# Patient Record
Sex: FEMALE | Race: WHITE | NOT HISPANIC OR LATINO | Employment: FULL TIME | URBAN - METROPOLITAN AREA
[De-identification: names, ages, dates, MRNs, and addresses within clinical notes are randomized per-mention and may not be internally consistent; named-entity substitution may affect disease eponyms.]

---

## 2022-08-10 ENCOUNTER — HOSPITAL ENCOUNTER (EMERGENCY)
Facility: MEDICAL CENTER | Age: 27
End: 2022-08-10
Attending: EMERGENCY MEDICINE
Payer: COMMERCIAL

## 2022-08-10 ENCOUNTER — PHARMACY VISIT (OUTPATIENT)
Dept: PHARMACY | Facility: MEDICAL CENTER | Age: 27
End: 2022-08-10
Payer: MEDICARE

## 2022-08-10 VITALS
TEMPERATURE: 97 F | HEART RATE: 100 BPM | DIASTOLIC BLOOD PRESSURE: 69 MMHG | OXYGEN SATURATION: 96 % | RESPIRATION RATE: 16 BRPM | HEIGHT: 68 IN | BODY MASS INDEX: 18.28 KG/M2 | WEIGHT: 120.59 LBS | SYSTOLIC BLOOD PRESSURE: 100 MMHG

## 2022-08-10 DIAGNOSIS — K08.89 PAIN, DENTAL: ICD-10-CM

## 2022-08-10 PROCEDURE — 700102 HCHG RX REV CODE 250 W/ 637 OVERRIDE(OP): Performed by: EMERGENCY MEDICINE

## 2022-08-10 PROCEDURE — RXOTC WILLOW AMBULATORY OTC CHARGE

## 2022-08-10 PROCEDURE — 99283 EMERGENCY DEPT VISIT LOW MDM: CPT | Mod: EDC

## 2022-08-10 PROCEDURE — RXMED WILLOW AMBULATORY MEDICATION CHARGE: Performed by: EMERGENCY MEDICINE

## 2022-08-10 PROCEDURE — A9270 NON-COVERED ITEM OR SERVICE: HCPCS | Performed by: EMERGENCY MEDICINE

## 2022-08-10 RX ORDER — AMOXICILLIN 500 MG/1
500 CAPSULE ORAL 3 TIMES DAILY
Qty: 21 CAPSULE | Refills: 0 | Status: SHIPPED | OUTPATIENT
Start: 2022-08-10 | End: 2022-08-17

## 2022-08-10 RX ORDER — OXYCODONE HYDROCHLORIDE 5 MG/1
5 TABLET ORAL EVERY 4 HOURS PRN
Qty: 5 TABLET | Refills: 0 | Status: SHIPPED | OUTPATIENT
Start: 2022-08-10 | End: 2022-08-15

## 2022-08-10 RX ORDER — IBUPROFEN 600 MG/1
600 TABLET ORAL ONCE
Status: COMPLETED | OUTPATIENT
Start: 2022-08-10 | End: 2022-08-10

## 2022-08-10 RX ADMIN — IBUPROFEN 600 MG: 600 TABLET ORAL at 12:03

## 2022-08-10 NOTE — ED PROVIDER NOTES
"ED Provider Note    Scribed for Christine Norris M.D. by Gerard Melendez. 8/10/2022  11:43 AM    Means of arrival: Walk-in  History obtained from: Patient  History limited by: None      CHIEF COMPLAINT  Chief Complaint   Patient presents with    Dental Pain     Pt complaining of left lower dental pain since Sunday. Pt thinks it might be infected.        EVELIN Boothe is a 27 y.o. female who presents to the Emergency Department for left lower dental pain onset Sunday (8/7/22). The patient has associated symptoms of mild swelling, but denies fever or vomiting. The patient states that she has used an ice pack, heating pad, and ibuprofen with no alleviation. She last took ibuprofen this morning, 5 hours ago. She denies any daily medications or chance of pregnancy. She states her pain is exacerbated by eating or drinking. The patient notes that she has note presented to a dentist recently to insurance, but is planning to obtain dental insurance.    REVIEW OF SYSTEMS  Pertinent positive include left lower dental pain and mild swelling. Pertinent negative include fever or vomiting. All other systems reviewed and are negative.    PAST MEDICAL HISTORY  None noted    SOCIAL HISTORY  Social History     Tobacco Use    Smoking status: Never    Smokeless tobacco: Never   Vaping Use    Vaping Use: Never used   Substance and Sexual Activity    Alcohol use: Not Currently    Drug use: Yes     Types: Inhaled     Comment: marijuana     SURGICAL HISTORY  patient denies any surgical history    CURRENT MEDICATIONS  Home Medications       Reviewed by Ailyn Jeffries R.N. (Registered Nurse) on 08/10/22 at 1136  Med List Status: Not Addressed     Medication Last Dose Status        Patient Francisco J Taking any Medications                           ALLERGIES  No Known Allergies    PHYSICAL EXAM   VITAL SIGNS: /75   Pulse (!) 115   Temp 36.2 °C (97.1 °F) (Temporal)   Resp 20   Ht 1.727 m (5' 8\")   Wt 54.7 kg (120 lb 9.5 " "oz)   LMP 07/24/2022   SpO2 97%   BMI 18.34 kg/m²    Constitutional: Nontoxic appearing, alert in no apparent distress.  HENT: Normocephalic, Atraumatic. Bilateral external ears normal. Nose normal.  Moist mucous membranes. Oropharynx clear. Left posterior molar broken with exposed root, no drainable abscess. No trismus or submandibular fullness.  Eyes: Pupils are equal and reactive. Conjunctiva normal.   Neck: Supple, full range of motion  Musculoskeletal: Atraumatic. No obvious deformities noted.  No lower extremity edema.  Skin: Warm, Dry.  No erythema, No rash.   Neurologic: Alert and oriented x3. Moving all extremities spontaneously without focal deficits.  Psychiatric: Affect normal, Mood normal, Appears appropriate and not intoxicated.    ED COURSE  Vitals:    08/10/22 1124 08/10/22 1205   BP: 105/75 100/69   Pulse: (!) 115 100   Resp: 20 16   Temp: 36.2 °C (97.1 °F) 36.1 °C (97 °F)   TempSrc: Temporal Temporal   SpO2: 97% 96%   Weight: 54.7 kg (120 lb 9.5 oz)    Height: 1.727 m (5' 8\")          Medications administered:  Medications   ibuprofen (MOTRIN) tablet 600 mg (600 mg Oral Given 8/10/22 1203)           MEDICAL DECISION MAKING  11:43 AM Patient seen and examined at bedside. The patient presents with dental pain. She is afebrile without significant facial swelling.  She has posterior molar with decay and exposed root.  No concern for significant abscess or Chente's angina.   Will discharge home with antibiotics and close dental follow up.  Patient understands plan of care and strict return precautions for new or changing symptoms.       DISPOSITION:  Patient will be discharged home in stable condition.    FOLLOW UP:  Steven Paulino  [address]  [place] [address]  [phone]  another numer    Angel Castro D.D.S.  [address]  [place] [address]  [phone]    Schedule an appointment as soon as possible for a visit       [facility], Emergency Dept  [address] [facility] [address]  [phone]    If symptoms worsen    OUTPATIENT MEDICATIONS:  Discharge Medication List as of 8/10/2022 12:07 PM        START taking these medications    Details   amoxicillin (AMOXIL) 500 MG Cap Take 1 Capsule by mouth 3 times a day for 7 days., Disp-21 Capsule, R-0, Normal      oxyCODONE immediate-release (ROXICODONE) 5 MG Tab Take 1 Tablet by mouth every four hours as needed for Severe Pain for up to 5 days., Disp-5 Tablet, R-0, Normal               IMPRESSION  (K08.89) Pain, dental    Results, diagnoses, and treatment options were discussed with the patient and/or family. Patient verbalized understanding of plan of care.    In prescribing controlled substances to this patient, I certify that I have obtained and reviewed the medical history of Sol Boothe. I have also made a good carly effort to obtain applicable records from other providers who have treated the patient and records did not demonstrate any increased risk of substance abuse that would prevent me from prescribing controlled substances.     I have conducted a physical exam and documented it. I have reviewed Ms. Boothe’s prescription history as maintained by the [place] Prescription Monitoring Program.      have assessed the patient’s risk for abuse, dependency, and addiction using the validated Opioid Risk Tool available at https://www.mdcalc.com/yponqf-twor-dzvm-ort-narcotic-abuse.     Given the above, I believe the benefits of controlled substance therapy outweigh the risks. The reasons for prescribing controlled substances include non-narcotic, oral analgesic alternatives have been inadequate for pain control. Accordingly, I have discussed the risk and benefits, treatment plan, and alternative therapies with the patient.      Gerard POOLE (Cass), am scribing for, and in the presence of, Christine Norris M.D..    Electronically signed by: Gerard Melendez (Cass), 8/10/2022    Christine POOLE  KARLEE Norris. personally performed the services described in this documentation, as scribed by Gerard Melendez in my presence, and it is both accurate and complete.    The note accurately reflects work and decisions made by me.  Christine Norris M.D.  8/10/2022  8:14 PM

## 2022-08-10 NOTE — ED NOTES
"Sol Boothe D/C'd.  Discharge instructions including s/s to return to ED, follow up appointments, hydration importance and pain management education provided to pt.    Pt verbalized understanding with no further questions and concerns.    Copy of discharge provided to pt.  Signed copy in chart.    Prescription for oxycodone and amoxil provided to pt. Consent signed by pt and in the chart.   Pt ambulates out of department; pt in NAD, awake, alert, and oriented.   VS /69   Pulse 100   Temp 36.1 °C (97 °F) (Temporal)   Resp 16   Ht 1.727 m (5' 8\")   Wt 54.7 kg (120 lb 9.5 oz)   LMP 07/24/2022   SpO2 96%   BMI 18.34 kg/m²         "

## 2022-08-10 NOTE — ED TRIAGE NOTES
"Chief Complaint   Patient presents with    Dental Pain     Pt complaining of left lower dental pain since Sunday. Pt thinks it might be infected.      /75   Pulse (!) 115   Temp 36.2 °C (97.1 °F) (Temporal)   Resp 20   Ht 1.727 m (5' 8\")   Wt 54.7 kg (120 lb 9.5 oz)   LMP 07/24/2022   SpO2 97%   BMI 18.34 kg/m²     Pt is ambulatory in and out of triage. Appropriate PPE worn throughout entire encounter. Pt placed back in the lobby and educated about triage process.    "

## 2022-08-10 NOTE — DISCHARGE INSTRUCTIONS
You were seen in the Emergency Department for dental pain.    Please use 1,000mg of tylenol or 600mg of ibuprofen every 6 hours as needed for pain.  Take antibiotics as directed.  Use stronger pain medication only as needed for severe pain.    Please follow up with a dentist or oral surgeon as soon as possible.    Return to the Emergency Department with fevers, uncontrolled pain or swelling, or other concerns.

## 2022-08-10 NOTE — LETTER
Sol Tl was seen and treated in our emergency department on 8/10/2022.  Please excuse from work today 8/10/22.    If you have any questions or concerns, please don't hesitate to call.      Dr. Norris